# Patient Record
Sex: MALE | Race: WHITE | NOT HISPANIC OR LATINO | Employment: UNEMPLOYED | URBAN - METROPOLITAN AREA
[De-identification: names, ages, dates, MRNs, and addresses within clinical notes are randomized per-mention and may not be internally consistent; named-entity substitution may affect disease eponyms.]

---
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
## 2023-05-22 ENCOUNTER — TELEPHONE (OUTPATIENT)
Dept: PEDIATRICS CLINIC | Facility: CLINIC | Age: 18
End: 2023-05-22

## 2023-05-22 NOTE — TELEPHONE ENCOUNTER
Attempted call, left message requesting return call regarding medication refill request received electronically from [facility]

## 2023-05-23 NOTE — TELEPHONE ENCOUNTER
Spoke to [employer] regarding electronic medication refill request from pharmacy  [employer] reports he is taking the escitalopram 5 mg and will be in need of a refill  Informed patient that he is overdue for a follow up  Scheduled for 7/3/2023  Will send electronic medication refill back to pharmacy  [employer] agreed with plan and verbalized understanding

## 2023-06-22 ENCOUNTER — RA CDI HCC (OUTPATIENT)
Dept: OTHER | Facility: HOSPITAL | Age: 18
End: 2023-06-22

## 2023-06-22 NOTE — PROGRESS NOTES
[facility] 73  coding opportunities       Chart reviewed, no opportunity found: CHART REVIEWED, NO OPPORTUNITY FOUND        Patients Insurance        Commercial Insurance: [employer]

## 2024-10-30 ENCOUNTER — TELEPHONE (OUTPATIENT)
Dept: PEDIATRICS CLINIC | Facility: CLINIC | Age: 19
End: 2024-10-30

## 2024-10-31 NOTE — TELEPHONE ENCOUNTER
10/31/24 4:06 PM     The office's request has been received, reviewed, and the patient chart updated. The PCP has successfully been removed with a patient attribution note. This message will now be completed.    Thank you  Odalys Roberts